# Patient Record
Sex: FEMALE | Race: WHITE
[De-identification: names, ages, dates, MRNs, and addresses within clinical notes are randomized per-mention and may not be internally consistent; named-entity substitution may affect disease eponyms.]

---

## 2020-10-31 ENCOUNTER — HOSPITAL ENCOUNTER (EMERGENCY)
Dept: HOSPITAL 96 - M.ERS | Age: 85
Discharge: ANOTHER HOSPITAL | End: 2020-10-31
Payer: MEDICARE

## 2020-10-31 VITALS — SYSTOLIC BLOOD PRESSURE: 174 MMHG | DIASTOLIC BLOOD PRESSURE: 95 MMHG

## 2020-10-31 VITALS — WEIGHT: 221.01 LBS | BODY MASS INDEX: 51.15 KG/M2 | HEIGHT: 55 IN

## 2020-10-31 DIAGNOSIS — Z20.828: ICD-10-CM

## 2020-10-31 DIAGNOSIS — I16.0: ICD-10-CM
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
## 2020-10-31 DIAGNOSIS — M19.90: ICD-10-CM

## 2020-10-31 DIAGNOSIS — I10: ICD-10-CM

## 2020-10-31 DIAGNOSIS — J18.9: ICD-10-CM
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
## 2020-10-31 DIAGNOSIS — S22.061A: Primary | ICD-10-CM

## 2020-10-31 DIAGNOSIS — Z96.653: ICD-10-CM

## 2020-10-31 LAB
ABSOLUTE EOSINOPHILS: 0.1 THOU/UL (ref 0–0.7)
ABSOLUTE MONOCYTES: 0.2 THOU/UL (ref 0–1.2)
ALBUMIN SERPL-MCNC: 3.6 G/DL (ref 3.4–5)
ALP SERPL-CCNC: 72 U/L (ref 46–116)
ALT SERPL-CCNC: 27 U/L (ref 30–65)
ANION GAP SERPL CALC-SCNC: 7 MMOL/L (ref 7–16)
AST SERPL-CCNC: 19 U/L (ref 15–37)
BILIRUB SERPL-MCNC: 1.5 MG/DL
BUN SERPL-MCNC: 23 MG/DL (ref 7–18)
CALCIUM SERPL-MCNC: 9.3 MG/DL (ref 8.5–10.1)
CHLORIDE SERPL-SCNC: 102 MMOL/L (ref 98–107)
CO2 SERPL-SCNC: 29 MMOL/L (ref 21–32)
CREAT SERPL-MCNC: 1 MG/DL (ref 0.6–1.3)
EOSINOPHIL NFR BLD: 1 %
GLUCOSE SERPL-MCNC: 212 MG/DL (ref 70–99)
GRANULOCYTES NFR BLD MANUAL: 85 %
HCT VFR BLD CALC: 44.5 % (ref 37–47)
HGB BLD-MCNC: 14.8 GM/DL (ref 12–15)
LYMPHOCYTES # BLD: 0.9 THOU/UL (ref 0.8–5.3)
LYMPHOCYTES NFR BLD AUTO: 9 %
MCH RBC QN AUTO: 30.6 PG (ref 26–34)
MCHC RBC AUTO-ENTMCNC: 33.3 G/DL (ref 28–37)
MCV RBC: 91.8 FL (ref 80–100)
MONOCYTES NFR BLD: 2 %
MPV: 8.4 FL. (ref 7.2–11.1)
NEUTROPHILS # BLD: 9 THOU/UL (ref 1.6–8.1)
NEUTS BAND NFR BLD: 3 %
NT-PRO BRAIN NAT PEPTIDE: (no result) PG/ML (ref ?–300)
NUCLEATED RBCS: 0 /100WBC
PLATELET # BLD EST: ADEQUATE 10*3/UL
PLATELET COUNT*: 156 THOU/UL (ref 150–400)
POTASSIUM SERPL-SCNC: 3.5 MMOL/L (ref 3.5–5.1)
PROT SERPL-MCNC: 7.1 G/DL (ref 6.4–8.2)
RBC # BLD AUTO: 4.85 MIL/UL (ref 4.2–5)
RBC MORPH BLD: NORMAL
RDW-CV: 13.4 % (ref 10.5–14.5)
SODIUM SERPL-SCNC: 138 MMOL/L (ref 136–145)
WBC # BLD AUTO: 10.2 THOU/UL (ref 4–11)

## 2020-11-01 NOTE — EKG
[place] [address]
Phone:  [phone]                     ELECTROCARDIOGRAM REPORT      
_______________________________________________________________________________
 
Name:         TIFFANIE MYERS                Room:                     [facility]#:    [id number]     Account #:     [id number]  
Admission:    10/31/20    Attend Phys:                     
Discharge:    10/31/20    Date of Birth: [birth year]  
Date of Service: 10/31/20 1627  Report #:      [id number]
        [id number]
_______________________________________________________________________________
THIS REPORT FOR:  //name//                      
 
                         [facility] ED
                                       
Test Date:    2020-10-31               Test Time:    16:27:12
Pat Name:     TIFFANIE MYERS             Department:   
Patient ID:   [id number]            Room:          
Gender:                               Technician:   
:          1935               Requested By: Justice Preston
Order Number: [id number] MD:   Redd Echols
                                 Measurements
Intervals                              Axis          
Rate:         80                       P:            80
MA:           205                      QRS:          -72
QRSD:         117                      T:            127
QT:           397                                    
QTc:          458                                    
                           Interpretive Statements
Sinus rhythm
Atrial premature complex
Left anterior fascicular block
LVH with secondary repolarization abnormality
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
No previous ECG available for comparison
Electronically Signed On 2020 12:49:34 CST by Redd Echols
[email or web address]
 
 
 
 
 
 
 
 
 
 
 
 
 
 
 
 
 
 
  <ELECTRONICALLY SIGNED>
                                           By: Redd Echols MD, FACC   
  20     1249
D: 10/31/20 1627   _____________________________________
T: 10/31/20 1627   Redd Echols MD, [facility]     /EPI